# Patient Record
Sex: MALE | Race: WHITE
[De-identification: names, ages, dates, MRNs, and addresses within clinical notes are randomized per-mention and may not be internally consistent; named-entity substitution may affect disease eponyms.]

---

## 2021-10-12 ENCOUNTER — HOSPITAL ENCOUNTER (EMERGENCY)
Dept: HOSPITAL 11 - JP.ED | Age: 36
Discharge: HOME | End: 2021-10-12
Payer: SELF-PAY

## 2021-10-12 DIAGNOSIS — Z88.2: ICD-10-CM

## 2021-10-12 DIAGNOSIS — M54.50: Primary | ICD-10-CM

## 2021-10-12 PROCEDURE — 96372 THER/PROPH/DIAG INJ SC/IM: CPT

## 2021-10-12 PROCEDURE — 99283 EMERGENCY DEPT VISIT LOW MDM: CPT

## 2021-10-12 NOTE — EDM.PDOC
ED HPI GENERAL MEDICAL PROBLEM





- General


Chief Complaint: Back Pain or Injury


Stated Complaint: LOWER LEFT BACK PAIN-CAN'T SIT


Time Seen by Provider: 10/12/21 14:10


Source of Information: Reports: Patient.  Denies: Old Records


History Limitations: Reports: No Limitations





- History of Present Illness


INITIAL COMMENTS - FREE TEXT/NARRATIVE: 





37 yo male is here with low back pain that began a couple days ago. He was doing

some remodeling in an [employer] home that he is moving into from [place] and the next

day while standing in the kitchen his back suddenly had severe pain that made it

hard to get around. He has done some stretching with partial relief. He went to 

a chiropractor yesterday and got no relief. No bowel or bladder dysfunction. No 

radiation down either leg. He gets a little relief by leaning to the right when 

he stands. Is here now alone after driving himself here. 


Onset: Sudden


Onset Date: 10/10/21


Duration: Day(s):, Constant


Location: Reports: Back


Quality: Reports: Ache


Severity: Severe


Improves with: Reports: Rest (sitting or lying)


Worsens with: Reports: Other (standing or bending/twisting)


Context: Reports: Other (See HPI)


Associated Symptoms: Reports: No Other Symptoms


Treatments PTA: Reports: Other (see below) (chiropractic)





- Related Data


                                    Allergies











Allergy/AdvReac Type Severity Reaction Status Date / Time


 


Sulfa (Sulfonamide Allergy Mild Other Verified 10/12/21 13:29





Antibiotics)     











Home Meds: 


                                    Home Meds





Acetaminophen/HYDROcodone [HYDROcodone-Acetaminophen 5-325 MG *] 1 - 2 tab PO 

Q4H PRN #12 each 10/12/21 [Rx]


Cyclobenzaprine [Flexeril] 5 - 10 mg PO TID PRN #14 tab 10/12/21 [Rx]











Past Medical History





- Past Health History


Medical/Surgical History: Denies Medical/Surgical History





Social & Family History





- Tobacco Use


Tobacco Use Status *Q: Never Tobacco User


Second Hand Smoke Exposure: No





- Caffeine Use


Caffeine Use: Reports: Coffee


Other Caffeine Use: 4 to 6 cups day





- Recreational Drug Use


Recreational Drug Use: No





ED ROS GENERAL





- Review of Systems


Review Of Systems: See Below


Constitutional: Reports: No Symptoms


HEENT: Reports: No Symptoms


Respiratory: Reports: No Symptoms


Cardiovascular: Reports: No Symptoms


GI/Abdominal: Reports: No Symptoms


Musculoskeletal: Reports: Back Pain (low)


Skin: Reports: No Symptoms


Neurological: Reports: No Symptoms





ED EXAM,LOWER BACK PAIN/INJURY





- Physical Exam


Exam: See Below


Exam Limited By: No Limitations


General Appearance: Alert, WD/WN, Mild Distress


Back Exam: Muscle Spasm (L lumbar paraspinous), Paraspinal Tenderness (L 

lumbar), Vertebral Tenderness (mild lumbar), Other (no SI jt pain).  No: CVA 

Tenderness (R), CVA Tenderness (L)


Extremities: Normal Inspection, Normal Range of Motion, Non-Tender, No Pedal 

Edema


Neurological: Alert, Normal Mood/Affect, Normal Dorsiflexion, CN II-XII Intact, 

No Motor/Sensory Deficits, Oriented x 3


DTR - Lower Extremities: 1+: Knee (R), Knee (L)


Psychiatric: Normal Affect, Normal Mood


Skin Exam: Warm, Dry, Intact, Normal Color, No Rash





Course





- Vital Signs


Last Recorded V/S: 


                                Last Vital Signs











Temp  35.9 C L  10/12/21 13:42


 


Pulse  64   10/12/21 13:42


 


Resp  20   10/12/21 13:42


 


BP  115/62   10/12/21 13:42


 


Pulse Ox  98   10/12/21 13:42














- Orders/Labs/Meds


Meds: 


Medications














Discontinued Medications














Generic Name Dose Route Start Last Admin





  Trade Name Freq  PRN Reason Stop Dose Admin


 


Ketorolac Tromethamine  30 mg  10/12/21 14:23  10/12/21 14:34





  Ketorolac 30 Mg/Ml Sdv  IM  10/12/21 14:24  30 mg





  NOW STA   Administration


 


Oxycodone/Acetaminophen  1 tab  10/12/21 14:25  10/12/21 14:34





  Acetaminophen/Oxycodone 325-5 Mg Tab  PO  10/12/21 14:26  1 tab





  ONETIME STA   Administration














Departure





- Departure


Time of Disposition: 15:00


Disposition: Home, Self-Care 01


Condition: Fair


Clinical Impression: 


Acute low back pain


Qualifiers:


 Back pain laterality: left Sciatica presence: without sciatica Qualified 

Code(s): M54.50 - Low back pain, unspecified








- Discharge Information


*PRESCRIPTION DRUG MONITORING PROGRAM REVIEWED*: Yes


*COPY OF PRESCRIPTION DRUG MONITORING REPORT IN PATIENT MARC: Yes


Prescriptions: 


Cyclobenzaprine [Flexeril] 5 - 10 mg PO TID PRN #14 tab


 PRN Reason: Spasms


Acetaminophen/HYDROcodone [HYDROcodone-Acetaminophen 5-325 MG *] 1 - 2 tab PO 

Q4H PRN #12 each


 PRN Reason: Pain


Referrals: 


PCP,None [Primary Care Provider] - 


Forms:  ED Department Discharge


Additional Instructions: 


Take Norco as directed for pain relief. Add either ibuprofen 600 mg every 6 hrs 

OR Aleve 2 every 8 hrs with food for pain relief. Use Flexeril as directed for 

muscle stiffness. Moist heat and massage may benefit. No lifting, bending or 

twisting. Physical therapy will contact you regarding their availability. Return

as needed. Use caution with driving when taking these meds. 





Sepsis Event Note (ED)





- Evaluation


Sepsis Screening Result: No Definite Risk





- Focused Exam


Vital Signs: 


                                   Vital Signs











  Temp Pulse Resp BP Pulse Ox


 


 10/12/21 13:42  35.9 C L  64  20  115/62  98